# Patient Record
Sex: FEMALE | Race: OTHER | NOT HISPANIC OR LATINO
[De-identification: names, ages, dates, MRNs, and addresses within clinical notes are randomized per-mention and may not be internally consistent; named-entity substitution may affect disease eponyms.]

---
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
## 2024-02-20 ENCOUNTER — APPOINTMENT (OUTPATIENT)
Dept: ORTHOPEDIC SURGERY | Facility: CLINIC | Age: 19
End: 2024-02-20
Payer: MEDICAID

## 2024-02-20 VITALS — SYSTOLIC BLOOD PRESSURE: 141 MMHG | DIASTOLIC BLOOD PRESSURE: 90 MMHG | HEART RATE: 79 BPM

## 2024-02-20 PROCEDURE — 99213 OFFICE O/P EST LOW 20 MIN: CPT

## 2024-02-20 NOTE — HISTORY OF PRESENT ILLNESS
[de-identified] : BENJIE NIXON  is a 19 year old F who presents for follow-up of her left knee.  She is status post left knee ACL reconstruction with BTB autograft and medial meniscus repair and partial lateral meniscectomy on 8/17/2023.  She is overall doing very well.  She denies any pain or swelling in the knee.  She stopped going to physical therapy in mid December as she says that her insurance ran out of visits and she also became busy with school and work.  She has been going to the gym intermittently by herself since then.  She has tried to jog but says that it feels weird when she does so.

## 2024-02-20 NOTE — DISCUSSION/SUMMARY
[de-identified] : Approximately 6 months status post left knee ACL reconstruction with medial meniscus repair and partial lateral meniscectomy.  She is overall doing well.  I did discuss with her the importance of continuing physical therapy and continuing working out in order to improve her strength.  At this time point, many patients are able to return to running.  She is likely slightly behind the rehab timeline as she has not been to PT for a few months.  She should try to go back to physical therapy at least once a week and should also go to the gym herself a few times a week in order to improve her strength.  She may try to progress from jogging to running as tolerated and as she improves her strength.  She should avoid playing any sports for the time being.  I will see her back in 3 months for repeat evaluation.  I have personally obtained the history, reviewed the ROS as noted, and performed the physical examination today. The patient and I discussed the assessment and options and developed the plan. All questions were answered and the patient stated their understanding of the treatment plan and appreciation of the visit.  My cumulative time spent on this patient's visit included: Preparation for the visit, review of the medical records, review of pertinent diagnostic studies, examination and counseling of the patient on the above diagnosis, treatment plan and prognosis, orders of diagnostic tests, medications and/or appropriate procedures and documentation in the medical records of today's visit.  This note was generated using dragon medical dictation software.  A reasonable effort has been made for proofreading its contents, but typos may still remain.  If there are any questions or points of clarification needed please notify my office.

## 2024-02-20 NOTE — PHYSICAL EXAM
[de-identified] : General: No apparent distress Cardiovascular: extremities warm and well-perfused, no cyanosis Pulmonary: non labored respirations  Left knee: Incisions healed no erythema, no drainage Negative Lachman, negative anterior drawer No swelling about the knee Knee range of motion: 0 to 125 degrees of flexion

## 2024-07-22 ENCOUNTER — NON-APPOINTMENT (OUTPATIENT)
Age: 19
End: 2024-07-22

## 2024-07-23 ENCOUNTER — APPOINTMENT (OUTPATIENT)
Dept: ORTHOPEDIC SURGERY | Facility: CLINIC | Age: 19
End: 2024-07-23

## 2024-07-29 ENCOUNTER — APPOINTMENT (OUTPATIENT)
Dept: ORTHOPEDIC SURGERY | Facility: CLINIC | Age: 19
End: 2024-07-29

## 2024-09-28 ENCOUNTER — OFFICE (OUTPATIENT)
Dept: URBAN - METROPOLITAN AREA CLINIC 12 | Facility: CLINIC | Age: 19
Setting detail: OPHTHALMOLOGY
End: 2024-09-28
Payer: MEDICAID

## 2024-09-28 DIAGNOSIS — H01.004: ICD-10-CM

## 2024-09-28 DIAGNOSIS — H01.001: ICD-10-CM

## 2024-09-28 PROBLEM — H52.7 REFRACTIVE ERROR ; BOTH EYES: Status: ACTIVE | Noted: 2024-09-28

## 2024-09-28 PROCEDURE — 92004 COMPRE OPH EXAM NEW PT 1/>: CPT | Performed by: OPHTHALMOLOGY

## 2024-09-28 ASSESSMENT — LID EXAM ASSESSMENTS
OS_BLEPHARITIS: LUL
OD_BLEPHARITIS: RUL

## 2024-09-28 ASSESSMENT — CONFRONTATIONAL VISUAL FIELD TEST (CVF)
OS_FINDINGS: FULL
OD_FINDINGS: FULL

## 2025-03-06 ENCOUNTER — APPOINTMENT (OUTPATIENT)
Dept: ORTHOPEDIC SURGERY | Facility: CLINIC | Age: 20
End: 2025-03-06
Payer: COMMERCIAL

## 2025-03-06 DIAGNOSIS — S83.242A OTHER TEAR OF MEDIAL MENISCUS, CURRENT INJURY, LEFT KNEE, INITIAL ENCOUNTER: ICD-10-CM

## 2025-03-06 DIAGNOSIS — S83.512A SPRAIN OF ANTERIOR CRUCIATE LIGAMENT OF LEFT KNEE, INITIAL ENCOUNTER: ICD-10-CM

## 2025-03-06 PROCEDURE — 99214 OFFICE O/P EST MOD 30 MIN: CPT

## (undated) DEVICE — TOURNIQUET ESMARK 6"

## (undated) DEVICE — DRILL BIT MICROAIRE TWIST 2X127MM

## (undated) DEVICE — SUT MONOSOF 4-0 18" C-13

## (undated) DEVICE — SHAVER BLADE LINVATEC FULL RADIUS RESECTOR 3.5MM

## (undated) DEVICE — DRAPE 3/4 SHEET 52X76"

## (undated) DEVICE — SUT WIRE # 2 TIGERLOOP

## (undated) DEVICE — DRSG COBAN 6"

## (undated) DEVICE — SUT POLYSORB 2-0 30" C-15 UNDYED

## (undated) DEVICE — POSITIONER STRAP ARMBOARD VELCRO TS-30

## (undated) DEVICE — GLV 8 PROTEXIS (WHITE)

## (undated) DEVICE — SUT PROLENE 3-0 36" RB-1

## (undated) DEVICE — SUT POLYSORB 0 30" GS-10 UNDYED

## (undated) DEVICE — SUT FIBERWIRE #2 38" STRAND 1 BLUE T-5 TAPER

## (undated) DEVICE — SOL IRR BAG NS 0.9% 3000ML

## (undated) DEVICE — PACK BASIC TIBURON LTXF STRL

## (undated) DEVICE — SUCTION YANKAUER NO CONTROL VENT

## (undated) DEVICE — TUBING SUCTION 20FT

## (undated) DEVICE — TOURNIQUET CUFF 34" DUAL PORT W PLC

## (undated) DEVICE — DRSG WEBRIL 6"

## (undated) DEVICE — ELCTR BOVIE PENCIL BLADE 10FT

## (undated) DEVICE — NDL SPINAL 18G X 3.5" (PINK)

## (undated) DEVICE — SYR ASEPTO

## (undated) DEVICE — SYR LUER LOK 10CC

## (undated) DEVICE — DVC SUCTION FLR PUDDLE GUPPY

## (undated) DEVICE — S&N FASTFIX 360 STRAIGHT KNOT PUSHER SET

## (undated) DEVICE — ARTHREX KIT ACL TRANSTIBIAL WITHOUT SAW BLADE

## (undated) DEVICE — SUT ORTHOCORD 2 36" MO-6

## (undated) DEVICE — S&N ARTHROCARE WAND COBLATION WEREWOLF FLOW 90

## (undated) DEVICE — SUT FIBERSTICK SIZE 2 50" BLUE

## (undated) DEVICE — SAW BLADE MICROAIRE SAGITTAL 9.4MMX25.4MMX0.6MM

## (undated) DEVICE — TUBING SET GRAVITY 4 SPIKE

## (undated) DEVICE — BLADE SCALPEL SAFETYLOCK #15

## (undated) DEVICE — VENODYNE/SCD SLEEVE CALF MEDIUM

## (undated) DEVICE — S&N FASTFIX 360 CURVED KNOT PUSHER SET

## (undated) DEVICE — SUT TAPE TIGERLOOP W NDL WHITE / BLACK

## (undated) DEVICE — BLADE SCALPEL SAFETYLOCK #10

## (undated) DEVICE — CONTAINER SPECIMEN 4OZ

## (undated) DEVICE — GLV 8 PROTEXIS (BLUE)

## (undated) DEVICE — POSITIONER STIRRUP STRAP W SLIP RING 19X3.5"

## (undated) DEVICE — DRAPE INSTRUMENT POUCH 6.75" X 11"

## (undated) DEVICE — WARMING BLANKET UPPER ADULT

## (undated) DEVICE — LAP PAD 18 X 18"

## (undated) DEVICE — ELCTR BOVIE TIP BLADE INSULATED 3" EDGE

## (undated) DEVICE — SPLINT IMMOBILIZER 3-PANEL KNEE 20"

## (undated) DEVICE — DRAPE LIGHT HANDLE COVER (GREEN)

## (undated) DEVICE — KNOT PUSHER WITH PORTAL SKID

## (undated) DEVICE — PACK KNEE ARTHROSCOPY

## (undated) DEVICE — DRAPE TOWEL BLUE 17" X 24"

## (undated) DEVICE — SUT SURGIPRO 0 30" GS-22